# Patient Record
(demographics unavailable — no encounter records)

---

## 2024-12-04 NOTE — HISTORY OF PRESENT ILLNESS
[de-identified] : The patient is a 31 year old RIGHT hand dominant male who presents today complaining of LT SHOULDER pain Date of Injury/Onset: September 2024 Pain:    At Rest: 0/10  With Activity:  5/10  Mechanism of injury: no specific АННА-  gradual onset of symptoms that got progressively worse  This is NOT a Work Related Injury being treated under Worker's Compensation. This is NOT an athletic injury occurring associated with an interscholastic or organized sports team. 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 med hx of RT Achillies repair in 2020.

## 2024-12-04 NOTE — PHYSICAL EXAM
[de-identified] : Neurologic: normal mood and affect, orientated and able to communicate Constitutional: well developed and well nourished  Left Shoulder: +Impingement test +Neer test +Barker test +Josué sign 4-/5 Supraspinatus Strength

## 2024-12-04 NOTE — DISCUSSION/SUMMARY
[de-identified] : Reviewed all MRI images with patient today and interpretation was provided. Patient was given prescription of formal physical therapy for strengthening and stretching. Patient will follow up in 6 weeks.     ***Recreational Therapist at [facility]***     ----------------------------------------------- Home Exercise The patient is instructed on a home exercise program.  MADELINE PERRY Acting as a Scribe for Dr. Dewey TARIQ, Madeline Perry, attest that this documentation has been prepared under the direction and in the presence of Provider Dr. Palomo.  Activity Modification The patient was advised to modify their activities.  Dx / Natural History The patient was advised of the diagnosis. The natural history of the pathology was explained in full to the patient in layman's terms. Several different treatment options were discussed and explained in full to the patient including the risks and benefits of both surgical and non-surgical treatments.  All questions and concerns were answered.  Pain Guide Activities The patient was advised to let pain guide the gradual advancement of activities.  LEV TARIQ explained to the patient that rest, ice, compression, and elevation would benefit them. They may return to activity after follow-up or when they no longer have any pain.  The patient's current medication management of their orthopedic diagnosis was reviewed today: (1) We discussed a comprehensive treatment plan that included possible pharmaceutical management involving the use of prescription strength medications including but not limited to options such as oral Naprosyn 500mg BID, once daily Meloxicam 15 mg, or 500-650 mg Tylenol versus over the counter oral medications and topical prescription NSAID Pennsaid vs over the counter Voltaren gel. (2) There is a moderate risk of morbidity with further treatment, especially from use of prescription strength medications and possible side effects of these medications which include upset stomach with oral medications, skin reactions to topical medications and cardiac/renal issues with long term use. (3) I recommended that the patient follow-up with their medical physician to discuss any significant specific potential issues with long term medication use such as interactions with current medications or with exacerbation of underlying medical comorbidities. (4) The benefits and risks associated with use of injectable, oral or topical, prescription and over the counter anti-inflammatory medications were discussed with the patient. The patient voiced understanding of the risks including but not limited to bleeding, stroke, kidney dysfunction, heart disease, and were referred to the black box warning label for further information.

## 2024-12-04 NOTE — HISTORY OF PRESENT ILLNESS
[de-identified] : The patient is a 31 year old RIGHT hand dominant male who presents today complaining of LT SHOULDER pain Date of Injury/Onset: September 2024 Pain:    At Rest: 0/10  With Activity:  5/10  Mechanism of injury: no specific АННА-  gradual onset of symptoms that got progressively worse  This is NOT a Work Related Injury being treated under Worker's Compensation. This is NOT an athletic injury occurring associated with an interscholastic or organized sports team. 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 med hx of RT Achillies repair in 2020.

## 2024-12-04 NOTE — HISTORY OF PRESENT ILLNESS
[de-identified] : The patient is a 31 year old RIGHT hand dominant male who presents today complaining of LT SHOULDER pain Date of Injury/Onset: September 2024 Pain:    At Rest: 0/10  With Activity:  5/10  Mechanism of injury: no specific АННА-  gradual onset of symptoms that got progressively worse  This is NOT a Work Related Injury being treated under Worker's Compensation. This is NOT an athletic injury occurring associated with an interscholastic or organized sports team. 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 med hx of RT Achillies repair in 2020.

## 2024-12-04 NOTE — DATA REVIEWED
[FreeTextEntry1] : 11/26/24 [facility] X-RAY Left Shoulder 3 views: This scan was reviewed and interpreted by Dr. Palomo, and his findings are- unremarkable  11/26/24 [facility] MRI Left Shoulder: This scan was reviewed and interpreted by Dr. Palomo, and his findings are- IMPRESSION: 1. Very mild low-grade partial-thickness interstitial tearing in the cranial fibers of the subscapularis tendon. No full-thickness rotator cuff tear is identified. The remainder of the rotator cuff is intact. 2. No labral pathology.

## 2024-12-04 NOTE — PHYSICAL EXAM
[de-identified] : Neurologic: normal mood and affect, orientated and able to communicate Constitutional: well developed and well nourished  Left Shoulder: +Impingement test +Neer test +Barker test +Josué sign 4-/5 Supraspinatus Strength

## 2024-12-04 NOTE — PHYSICAL EXAM
[de-identified] : Neurologic: normal mood and affect, orientated and able to communicate Constitutional: well developed and well nourished  Left Shoulder: +Impingement test +Neer test +Barker test +Josué sign 4-/5 Supraspinatus Strength

## 2024-12-04 NOTE — DISCUSSION/SUMMARY
[de-identified] : Reviewed all MRI images with patient today and interpretation was provided. Patient was given prescription of formal physical therapy for strengthening and stretching. Patient will follow up in 6 weeks.     ***Recreational Therapist at [facility]***     ----------------------------------------------- Home Exercise The patient is instructed on a home exercise program.  MADELINE PERRY Acting as a Scribe for Dr. Dewey TARIQ, Madeline Perry, attest that this documentation has been prepared under the direction and in the presence of Provider Dr. Palomo.  Activity Modification The patient was advised to modify their activities.  Dx / Natural History The patient was advised of the diagnosis. The natural history of the pathology was explained in full to the patient in layman's terms. Several different treatment options were discussed and explained in full to the patient including the risks and benefits of both surgical and non-surgical treatments.  All questions and concerns were answered.  Pain Guide Activities The patient was advised to let pain guide the gradual advancement of activities.  LEV TARIQ explained to the patient that rest, ice, compression, and elevation would benefit them. They may return to activity after follow-up or when they no longer have any pain.  The patient's current medication management of their orthopedic diagnosis was reviewed today: (1) We discussed a comprehensive treatment plan that included possible pharmaceutical management involving the use of prescription strength medications including but not limited to options such as oral Naprosyn 500mg BID, once daily Meloxicam 15 mg, or 500-650 mg Tylenol versus over the counter oral medications and topical prescription NSAID Pennsaid vs over the counter Voltaren gel. (2) There is a moderate risk of morbidity with further treatment, especially from use of prescription strength medications and possible side effects of these medications which include upset stomach with oral medications, skin reactions to topical medications and cardiac/renal issues with long term use. (3) I recommended that the patient follow-up with their medical physician to discuss any significant specific potential issues with long term medication use such as interactions with current medications or with exacerbation of underlying medical comorbidities. (4) The benefits and risks associated with use of injectable, oral or topical, prescription and over the counter anti-inflammatory medications were discussed with the patient. The patient voiced understanding of the risks including but not limited to bleeding, stroke, kidney dysfunction, heart disease, and were referred to the black box warning label for further information.

## 2024-12-04 NOTE — ADDENDUM
[FreeTextEntry1] : Documented by Catrachito Fofana acting as a scribe for Dr. Palomo and Brendon Valdez PA-C on 12/03/2024 and was presence for the following sections: Physical Exam; Data Reviewed; Assessment; Discussion/Summary. All medical record entries made by the Scribe were at my, Dr. Palomo, and Brendon Valdez, direction and personally dictated by me on 12/03/2024. I have reviewed the chart and agree that the record accurately reflects my personal performance of the history, physical exam, procedure and imaging.

## 2024-12-04 NOTE — DISCUSSION/SUMMARY
[de-identified] : Reviewed all MRI images with patient today and interpretation was provided. Patient was given prescription of formal physical therapy for strengthening and stretching. Patient will follow up in 6 weeks.     ***Recreational Therapist at [facility]***     ----------------------------------------------- Home Exercise The patient is instructed on a home exercise program.  MADELINE PERRY Acting as a Scribe for Dr. Dewey TARIQ, Madeline Perry, attest that this documentation has been prepared under the direction and in the presence of Provider Dr. Palomo.  Activity Modification The patient was advised to modify their activities.  Dx / Natural History The patient was advised of the diagnosis. The natural history of the pathology was explained in full to the patient in layman's terms. Several different treatment options were discussed and explained in full to the patient including the risks and benefits of both surgical and non-surgical treatments.  All questions and concerns were answered.  Pain Guide Activities The patient was advised to let pain guide the gradual advancement of activities.  LEV TARIQ explained to the patient that rest, ice, compression, and elevation would benefit them. They may return to activity after follow-up or when they no longer have any pain.  The patient's current medication management of their orthopedic diagnosis was reviewed today: (1) We discussed a comprehensive treatment plan that included possible pharmaceutical management involving the use of prescription strength medications including but not limited to options such as oral Naprosyn 500mg BID, once daily Meloxicam 15 mg, or 500-650 mg Tylenol versus over the counter oral medications and topical prescription NSAID Pennsaid vs over the counter Voltaren gel. (2) There is a moderate risk of morbidity with further treatment, especially from use of prescription strength medications and possible side effects of these medications which include upset stomach with oral medications, skin reactions to topical medications and cardiac/renal issues with long term use. (3) I recommended that the patient follow-up with their medical physician to discuss any significant specific potential issues with long term medication use such as interactions with current medications or with exacerbation of underlying medical comorbidities. (4) The benefits and risks associated with use of injectable, oral or topical, prescription and over the counter anti-inflammatory medications were discussed with the patient. The patient voiced understanding of the risks including but not limited to bleeding, stroke, kidney dysfunction, heart disease, and were referred to the black box warning label for further information.

## 2025-01-15 NOTE — DISCUSSION/SUMMARY
[de-identified] : Reviewed all MRI images with patient today and interpretation was provided. Patient was given prescription of formal physical therapy for strengthening and stretching. Patient will follow up in 6 weeks.     ***Recreational Therapist at [facility]***   Left Shoulder Subacromial steroid injection procedure note ULTRASOUND GUIDED: Patient Identification Name/: Verbal with patient and/or family   Procedure Verification: Procedure confirmed with patient or family/designee Consent for procedure: Verbal Consent Given Relevant documentation completed, reviewed, and signed Clinical indications for procedure confirmed  Time-out with all members of procedure team immediately prior to procedure: Correct patient identified. Agreement on procedure. Correct side and site.  SHOULDER SUBACROMIAL INJECTION - LEFT After verbal consent and identification of the correct patient and correct site, the LEFT posterolateral shoulder was prepped using alcohol swabs and betadine. This was allowed time to air dry. A mixture of Kenalog, Bupivacaine was injected into the left shoulder subacromial space using a sterile 22G needle after ethyl chloride spray for skin anesthesia. The patient tolerated the procedure well.  A sterile dressing was placed.  After-care instructions were provided and included instructions to ice the area and to call if redness  ----------------------------------------------- Home Exercise The patient is instructed on a home exercise program.  MADELINE PERRY Acting as a Scribe for Dr. Dewey TARIQ, Madeline Perry, attest that this documentation has been prepared under the direction and in the presence of Provider Dr. Palomo.  Activity Modification The patient was advised to modify their activities.  Dx / Natural History The patient was advised of the diagnosis. The natural history of the pathology was explained in full to the patient in layman's terms. Several different treatment options were discussed and explained in full to the patient including the risks and benefits of both surgical and non-surgical treatments.  All questions and concerns were answered.  Pain Guide Activities The patient was advised to let pain guide the gradual advancement of activities.  LEV TARIQ explained to the patient that rest, ice, compression, and elevation would benefit them. They may return to activity after follow-up or when they no longer have any pain.  The patient's current medication management of their orthopedic diagnosis was reviewed today: (1) We discussed a comprehensive treatment plan that included possible pharmaceutical management involving the use of prescription strength medications including but not limited to options such as oral Naprosyn 500mg BID, once daily Meloxicam 15 mg, or 500-650 mg Tylenol versus over the counter oral medications and topical prescription NSAID Pennsaid vs over the counter Voltaren gel. (2) There is a moderate risk of morbidity with further treatment, especially from use of prescription strength medications and possible side effects of these medications which include upset stomach with oral medications, skin reactions to topical medications and cardiac/renal issues with long term use. (3) I recommended that the patient follow-up with their medical physician to discuss any significant specific potential issues with long term medication use such as interactions with current medications or with exacerbation of underlying medical comorbidities. (4) The benefits and risks associated with use of injectable, oral or topical, prescription and over the counter anti-inflammatory medications were discussed with the patient. The patient voiced understanding of the risks including but not limited to bleeding, stroke, kidney dysfunction, heart disease, and were referred to the black box warning label for further information.

## 2025-01-15 NOTE — PHYSICAL EXAM
[de-identified] : Neurologic: normal mood and affect, orientated and able to communicate Constitutional: well developed and well nourished  Left Shoulder: +Impingement test +Neer test +Barker test +Josué sign 4-/5 Supraspinatus Strength

## 2025-01-15 NOTE — HISTORY OF PRESENT ILLNESS
[de-identified] : The patient is a 31 year old RIGHT hand dominant male who presents today complaining of LT SHOULDER pain Date of Injury/Onset: September 2024 Pain:    At Rest: 0/10  With Activity:  5/10  Mechanism of injury: no specific АННА-  gradual onset of symptoms that got progressively worse  This is NOT a Work Related Injury being treated under Worker's Compensation. This is NOT an athletic injury occurring associated with an interscholastic or organized sports team. Quality of symptoms: sharp pain with movement posterior RTC  Improves with: rest, activity modification  Worse with: horizontal adduction, lifting overhead, sleeping on LT side, shooting basketball Treatment/Imaging/Studies Since Last Visit: PT @ [facility]- working on ROM and RTC strengthening. Only had two formal visits of PT. Reports HEP at home 	Reports Available For Review Today: MRI @ [facility] 11/26/24 Change since last visit: Patient reports mild dec in pain. Was only able to perform 2 formal PT visits. Reports little to no discomfort with HEP. Biggest c/o pain is in AM after side sleeping. 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 med hx of RT Achillies repair in 2020.